# Patient Record
Sex: MALE | Race: WHITE | NOT HISPANIC OR LATINO | Employment: OTHER | URBAN - METROPOLITAN AREA
[De-identification: names, ages, dates, MRNs, and addresses within clinical notes are randomized per-mention and may not be internally consistent; named-entity substitution may affect disease eponyms.]

---

## 2019-02-14 ENCOUNTER — HOSPITAL ENCOUNTER (EMERGENCY)
Facility: HOSPITAL | Age: 79
Discharge: HOME | End: 2019-02-14
Attending: EMERGENCY MEDICINE
Payer: MEDICARE

## 2019-02-14 ENCOUNTER — APPOINTMENT (EMERGENCY)
Dept: RADIOLOGY | Facility: HOSPITAL | Age: 79
End: 2019-02-14
Payer: MEDICARE

## 2019-02-14 VITALS
TEMPERATURE: 97.5 F | WEIGHT: 208.34 LBS | HEART RATE: 60 BPM | OXYGEN SATURATION: 100 % | DIASTOLIC BLOOD PRESSURE: 74 MMHG | RESPIRATION RATE: 16 BRPM | SYSTOLIC BLOOD PRESSURE: 147 MMHG

## 2019-02-14 DIAGNOSIS — S43.014A ANTERIOR DISLOCATION OF RIGHT SHOULDER, INITIAL ENCOUNTER: Primary | ICD-10-CM

## 2019-02-14 PROCEDURE — 73030 X-RAY EXAM OF SHOULDER: CPT

## 2019-02-14 PROCEDURE — 73020 X-RAY EXAM OF SHOULDER: CPT

## 2019-02-14 PROCEDURE — 96374 THER/PROPH/DIAG INJ IV PUSH: CPT

## 2019-02-14 PROCEDURE — 99284 EMERGENCY DEPT VISIT MOD MDM: CPT

## 2019-02-14 PROCEDURE — 73060 X-RAY EXAM OF HUMERUS: CPT

## 2019-02-14 RX ORDER — FENTANYL CITRATE 50 UG/ML
50 INJECTION, SOLUTION INTRAMUSCULAR; INTRAVENOUS ONCE
Status: COMPLETED | OUTPATIENT
Start: 2019-02-14 | End: 2019-02-14

## 2019-02-14 RX ORDER — LISINOPRIL 5 MG/1
5 TABLET ORAL DAILY
COMMUNITY

## 2019-02-14 RX ORDER — ACETAMINOPHEN 325 MG/1
650 TABLET ORAL EVERY 6 HOURS PRN
Qty: 30 TABLET | Refills: 0 | Status: SHIPPED | OUTPATIENT
Start: 2019-02-14

## 2019-02-14 RX ORDER — ATORVASTATIN CALCIUM 40 MG/1
40 TABLET, FILM COATED ORAL DAILY
COMMUNITY

## 2019-02-14 RX ORDER — LATANOPROST 50 UG/ML
1 SOLUTION/ DROPS OPHTHALMIC
COMMUNITY

## 2019-02-14 RX ORDER — METOPROLOL SUCCINATE 25 MG/1
25 TABLET, EXTENDED RELEASE ORAL DAILY
COMMUNITY

## 2019-02-14 RX ORDER — ASPIRIN 81 MG/1
81 TABLET ORAL DAILY
COMMUNITY

## 2019-02-14 RX ORDER — LIDOCAINE HYDROCHLORIDE 20 MG/ML
10 INJECTION, SOLUTION EPIDURAL; INFILTRATION; INTRACAUDAL; PERINEURAL ONCE
Status: COMPLETED | OUTPATIENT
Start: 2019-02-14 | End: 2019-02-14

## 2019-02-14 RX ADMIN — FENTANYL CITRATE 50 MCG: 50 INJECTION, SOLUTION INTRAMUSCULAR; INTRAVENOUS at 11:48

## 2019-02-14 RX ADMIN — LIDOCAINE HYDROCHLORIDE 10 ML: 20 INJECTION, SOLUTION EPIDURAL; INFILTRATION; INTRACAUDAL; PERINEURAL at 11:54

## 2019-02-14 NOTE — ED NOTES
Patient denies hitting head but does have some pain in right side of neck       [address] RN  02/14/19 8170

## 2019-02-14 NOTE — ED PROVIDER NOTES
History  Chief Complaint   Patient presents with    Fall     Patient slipped on ice and fell  He landed with right arm outstretched  Pain in right should and humerus  No deformity noted  Patient denies hitting head     [de-identified] y/o M with PMhx of HTN, HLD, who presents to the ED for right shoulder pain s/p mechanical slip and fall on ice today  Patient reports that he fell onto the outstretched right upper extremity, resulting in decreased ROM and pain to the right shoulder and upper arm  Patient denies any numbness, tingling, weakness  Denies redness, warmth  Denies fevers, chills  Denies chest pain, SOB, palpitations  No pain Rx given prior to arrival        History provided by:  Patient   used: No        Prior to Admission Medications   Prescriptions Last Dose Informant Patient Reported? Taking? Cholecalciferol 1000 units TBDP   Yes Yes   Sig: Take 1,000 Units by mouth daily   aspirin (ECOTRIN LOW STRENGTH) 81 mg EC tablet   Yes Yes   Sig: Take 81 mg by mouth daily   atorvastatin (LIPITOR) 40 mg tablet   Yes Yes   Sig: Take 40 mg by mouth daily   latanoprost (XALATAN) 0 005 % ophthalmic solution   Yes Yes   Sig: Administer 1 drop to both eyes daily at bedtime   lisinopril (ZESTRIL) 5 mg tablet   Yes Yes   Sig: Take 5 mg by mouth daily   metoprolol succinate (TOPROL-XL) 25 mg 24 hr tablet   Yes Yes   Sig: Take 25 mg by mouth daily   timolol (BETIMOL) 0 25 % ophthalmic solution   Yes Yes   Sig: Administer 1 drop to both eyes daily      Facility-Administered Medications: None       History reviewed  No pertinent past medical history  History reviewed  No pertinent surgical history  History reviewed  No pertinent family history  I have reviewed and agree with the history as documented      Social History     Tobacco Use    Smoking status: Former Smoker    Smokeless tobacco: Never Used   Substance Use Topics    Alcohol use: Not on file    Drug use: Not on file        Review of Systems Constitutional: Negative for chills and fever  HENT: Negative for congestion, ear pain, postnasal drip, rhinorrhea, sinus pressure, sinus pain, sore throat and trouble swallowing  Eyes: Negative  Respiratory: Negative for cough, chest tightness, shortness of breath and wheezing  Cardiovascular: Negative for chest pain, palpitations and leg swelling  Gastrointestinal: Negative for abdominal pain, anal bleeding, constipation, diarrhea, nausea and vomiting  Genitourinary: Negative for dysuria, flank pain, frequency, hematuria and urgency  Musculoskeletal: Positive for arthralgias  Negative for back pain, gait problem, joint swelling, myalgias, neck pain and neck stiffness  Skin: Negative for color change, pallor, rash and wound  Neurological: Negative for dizziness, syncope, weakness, light-headedness, numbness and headaches  Psychiatric/Behavioral: Negative  Physical Exam  Physical Exam   Constitutional: He is oriented to person, place, and time  He appears well-developed and well-nourished  No distress  HENT:   Head: Normocephalic and atraumatic  Eyes: Pupils are equal, round, and reactive to light  Conjunctivae and EOM are normal    Neck: Normal range of motion  Neck supple  Cardiovascular: Normal rate, regular rhythm and intact distal pulses  Pulmonary/Chest: Effort normal and breath sounds normal  He has no wheezes  He has no rales  Abdominal: Soft  Bowel sounds are normal  He exhibits no distension  There is no tenderness  There is no rebound and no guarding  Musculoskeletal: He exhibits tenderness and deformity  He exhibits no edema  Decreased ROM of the right shoulder  There is an empty socket present to the right shoulder  +Pain with palpation to the right proximal shoulder  No swelling  Neurological: He is alert and oriented to person, place, and time  No cranial nerve deficit or sensory deficit  He exhibits normal muscle tone   Coordination normal    Median, ulnar, radial nerves are +5/5 for sensation and strength to bilateral upper extremities  Skin: Skin is warm and dry  Capillary refill takes less than 2 seconds  He is not diaphoretic  Psychiatric: He has a normal mood and affect  His behavior is normal    Nursing note and vitals reviewed  Vital Signs  ED Triage Vitals   Temperature Pulse Respirations Blood Pressure SpO2   02/14/19 0941 02/14/19 0941 02/14/19 0941 02/14/19 0941 02/14/19 0941   97 5 °F (36 4 °C) 66 16 145/84 95 %      Temp Source Heart Rate Source Patient Position - Orthostatic VS BP Location FiO2 (%)   02/14/19 0941 02/14/19 1225 02/14/19 1225 02/14/19 1225 --   Oral Monitor Lying Right arm       Pain Score       02/14/19 0941       5           Vitals:    02/14/19 0941 02/14/19 1225 02/14/19 1404   BP: 145/84 141/86 147/74   Pulse: 66 80 60   Patient Position - Orthostatic VS:  Lying Lying       Visual Acuity      ED Medications  Medications   lidocaine (PF) (XYLOCAINE-MPF) 2 % injection 10 mL (10 mL Infiltration Given by Other 2/14/19 1154)   fentanyl citrate (PF) 100 MCG/2ML 50 mcg (50 mcg Intravenous Given 2/14/19 1148)       Diagnostic Studies  Results Reviewed     None                 XR shoulder 2+ vw right   ED Interpretation by Libbie Gaucher, PA-C (02/14 1510)   Successful reduction of shoulder  Final Result by Clay Field MD (02/14 1609)      1  Successful reduction of right shoulder dislocation  2   Hill-Sachs fracture of the right humeral head  Workstation performed: [id number]         XR shoulder 1 vw right   Final Result by Anthony Bell MD (02/14 1307)      Persistent dislocation of the right shoulder            Workstation performed: [id number]         XR humerus RIGHT   ED Interpretation by Libbie Gaucher, PA-C (02/14 1134)   No fracture  Final Result by Archie Bradford MD (02/14 1220)      Anterior right shoulder dislocation              Workstation performed: [id number]         XR shoulder 2+ views RIGHT   ED Interpretation by Thai Quiroga PA-C (02/14 3911)   Anterior Humeral head dislocation      Final Result by Dorinda Ruiz MD (02/14 1221)      Anterior right shoulder dislocation  No discrete fracture  As per comments in the PACS workstation, findings are concordant with preliminary interpretation provided by the emergency room physician  Workstation performed: [id number]                    Procedures  Orthopedic Injury  Date/Time: 2/14/2019 12:55 PM  Performed by: Thai Quiroga PA-C  Authorized by: Feli Milton DO     Patient Location:  ED  Other Assisting Provider: Yes (comment) (Dr Evette Hester)    Verbal consent obtained?: Yes    Written consent obtained?: Yes    Emergent situation    Risks and benefits: Risks, benefits and alternatives were discussed    Consent given by:  Patient  Patient identity confirmed:  Verbally with patient  Injury location:  Shoulder  Location details:  Right shoulder  Injury type:  Dislocation  Dislocation type: anterior    Chronicity:  New  Neurovascular status: Neurovascularly intact    Distal perfusion: normal    Neurological function: normal    Range of motion: reduced    Local anesthesia used?: Yes    Anesthesia method: intraarticular    Local anesthetic:  Lidocaine 2% without epinephrine  Anesthetic total (ml):  10  Manipulation performed?: Yes    Reduction method:  Scapular manipulation, traction and counter traction, external rotation, Spaso technique, Milch technique and Young maneuver  Reduction method:  Scapular manipulation, traction and counter traction, external rotation, Spaso technique, Milch technique and Young maneuver  Reduction method:  Scapular manipulation, traction and counter traction, external rotation, Spaso technique, Milch technique and Young maneuver  Reduction method:  Scapular manipulation, traction and counter traction, external rotation, Spaso technique, Milch technique and Young maneuver  Reduction method: Scapular manipulation, traction and counter traction, external rotation, Spaso technique, Milch technique and Young maneuver  Reduction method:  Scapular manipulation, traction and counter traction, external rotation, Spaso technique, Milch technique and Young maneuver  Reduction successful?: No    Confirmation: Reduction confirmed by x-ray    Immobilization:  Sling  Neurovascular status: Neurovascularly intact    Distal perfusion: normal    Neurological function: normal    Range of motion: improved    Patient tolerance:  Patient tolerated the procedure well with no immediate complications   ER attempt at reduction unsuccessful  Reduction was successful following orthopedist attempt  Confirmed with XR  Phone Contacts  ED Phone Contact    ED Course  ED Course as of Feb 15 2238   Thu Feb 14, 2019   1334 XR shoulder s/p reduction shows continued dislocation  Reduction attempted with Dr Taylor Lombardi  Feeling humeral head slip in and out of the joint space  Concerned for unstable joint  Will contact ortho for recommendation  Vene 89 with Dr Ok Escobar (ortho), who will notify Dr Lavon Szymanski or Howard (who are at [facility] today for surgery) regarding patient  1502 Seen in the ED by Dr Lavon Szymanski and PA  They were able to get shoulder reduced with patient with improved ROM  Ordered repeat shoulder XR       1510 XR reviewed and shows successful reduction of shoulder  MDM  Number of Diagnoses or Management Options  Anterior dislocation of right shoulder, initial encounter:   Diagnosis management comments: Differential Diagnosis includes but is not limited to: sprain/strain, contusion, fracture/dislocation, musculoskeletal pain  XR right shoulder shows dislocation, but no acute fracture  Reduction attempted  Patient had good range of motion of the right shoulder and improved pain  XR of the right shoulder post reduction shows continued dislocation   Dr Taylor Lombardi also attempted reduction  Felt humeral head enter and exit joint space multiple times  Ortho consulted as patient might have an unstable joint as he had previous injury years ago and never regained full ROM of the shoulder  Dr Can Mar and BAKARI Chaney saw patient in the ED and performed successful reduction that was confirmed with XR  Patient placed in sling  Instructed to follow up with ortho within the next week  Amount and/or Complexity of Data Reviewed  Tests in the radiology section of CPT®: reviewed and ordered  Discuss the patient with other providers: yes (Orthopedics)  Independent visualization of images, tracings, or specimens: yes        Disposition  Final diagnoses:   Anterior dislocation of right shoulder, initial encounter     Time reflects when diagnosis was documented in both MDM as applicable and the Disposition within this note     Time User Action Codes Description Comment    2/14/2019  3:11 PM Trevor Vásquez Add [S43 [id number]] Anterior dislocation of right shoulder, initial encounter       ED Disposition     ED Disposition Condition Date/Time Comment    Discharge Stable Thu Feb 14, 2019  3:11 PM [address] [place] [address] discharge to home/self care  Follow-up Information     Follow up With Specialties Details Why Contact Info Additional Fallon Willard 44 Orthopedic Surgery In 1 week right shoulder dislocation   [address] [address]  [address] [place] [address], [address] [place] [address],  [address] [place] [address], [id number]          Discharge Medication List as of 2/14/2019  3:15 PM      START taking these medications    Details   acetaminophen (TYLENOL) 325 mg tablet Take 2 tablets (650 mg total) by mouth every 6 (six) hours as needed for mild pain or moderate pain, Starting Thu 2/14/2019, Print         CONTINUE these medications which have NOT CHANGED    Details   aspirin (Roberto Barclay LOW STRENGTH) 81 mg EC tablet Take 81 mg by mouth daily, Historical Med      atorvastatin (LIPITOR) 40 mg tablet Take 40 mg by mouth daily, Historical Med      Cholecalciferol 1000 units TBDP Take 1,000 Units by mouth daily, Historical Med      latanoprost (XALATAN) 0 005 % ophthalmic solution Administer 1 drop to both eyes daily at bedtime, Historical Med      lisinopril (ZESTRIL) 5 mg tablet Take 5 mg by mouth daily, Historical Med      metoprolol succinate (TOPROL-XL) 25 mg 24 hr tablet Take 25 mg by mouth daily, Historical Med      timolol (BETIMOL) 0 25 % ophthalmic solution Administer 1 drop to both eyes daily, Historical Med           No discharge procedures on file      ED Provider  Electronically Signed by           John Chi PA-C  02/15/19 9808

## 2019-02-14 NOTE — ED ATTENDING ATTESTATION
Marian Whitehead DO, saw and evaluated the patient  I have discussed the patient with the resident/non-physician practitioner and agree with the resident's/non-physician practitioner's findings, Plan of Care, and MDM as documented in the resident's/non-physician practitioner's note, except where noted  All available labs and Radiology studies were reviewed  At this point I agree with the current assessment done in the Emergency Department  I have conducted an independent evaluation of this patient a history and physical is as follows:    23-year-old male presents for evaluation of right shoulder discomfort after fall  On examination, the patient is neurovascularly intact  The patient has full range of motion of the right upper extremity  On palpation there is a deformity consistent with an anterior shoulder dislocation despite full range of motion without anesthesia  The x-rays reviewed after the PA attempted reduction without success  I attempted numerous techniques for reduction  The patient has good mobility of the arm and shoulder  I can feel the humeral head moving within the shoulder joint however it does not stay in place    Discussed plan to discuss with Orthopedics      Critical Care Time  Procedures

## 2019-02-14 NOTE — ED NOTES
Provider at bedside [facility]      Josué Hernandez RN  02/14/19 4128

## 2019-02-19 ENCOUNTER — APPOINTMENT (OUTPATIENT)
Dept: RADIOLOGY | Facility: CLINIC | Age: 79
End: 2019-02-19
Payer: MEDICARE

## 2019-02-19 VITALS
HEART RATE: 63 BPM | BODY MASS INDEX: 31.08 KG/M2 | SYSTOLIC BLOOD PRESSURE: 119 MMHG | HEIGHT: 67 IN | WEIGHT: 198 LBS | DIASTOLIC BLOOD PRESSURE: 78 MMHG

## 2019-02-19 DIAGNOSIS — M25.511 RIGHT SHOULDER PAIN, UNSPECIFIED CHRONICITY: ICD-10-CM

## 2019-02-19 DIAGNOSIS — M25.511 RIGHT SHOULDER PAIN, UNSPECIFIED CHRONICITY: Primary | ICD-10-CM

## 2019-02-19 PROCEDURE — 99204 OFFICE O/P NEW MOD 45 MIN: CPT | Performed by: ORTHOPAEDIC SURGERY

## 2019-02-19 PROCEDURE — 73030 X-RAY EXAM OF SHOULDER: CPT

## 2019-02-19 NOTE — PROGRESS NOTES
Orthopaedic Surgery - Office Note  Nabila Rios (13 y o  male)   : 1940   MRN: [id number]  Encounter Date: 2019    Chief Complaint   Patient presents with    Right Shoulder - Pain       Assessment / Plan  Status post right shoulder anterior dislocation on 2019    · We did have a long discussion about treatment options following shoulder dislocation  At this time the patient agreed to proceed with physical therapy to try to regain range of motion strengthening of the right shoulder  We did discuss that it was common to have a rotator cuff tear along with the dislocation  If he is not able to progress with physical therapy will consider MRI study to further evaluate his shoulder  · Continue with ice and analgesics as needed  · Patient was given a prescription for physical therapy to start range of motion strengthening exercises  · Patient can wean from the sling as tolerated  Return in about 1 month (around 3/19/2019)  History of Present Illness  Nabila Rios is a [de-identified] y o  male who presents as a follow-up after sustaining a right shoulder anterior dislocation on 2019 when he slipped on ice  The patient was seen in the emergency room as a consult and we did reduce his dislocation at that time  He was discharged home in a sling and has been compliant with minimal use of the right arm since  He feels that since his pain has been improving greatly and at this time seems pretty minimal   He does have stiffness in his shoulder as expected  He has full range of motion in his elbow  He denies any numbness or tingling distally  He denies any previous injury to that arm  Review of Systems  Pertinent items are noted in HPI  All other systems were reviewed and are negative  Physical Exam  /78   Pulse 63   Ht 5' 7" (1 702 m)   Wt 89 8 kg (198 lb)   BMI 31 01 kg/m²   Cons: Appears well  No apparent distress  Psych: Alert  Oriented x3    Mood and affect normal   Eyes: PERRLA, EOMI  Resp: Normal effort  No audible wheezing or stridor  CV: Palpable pulse  No discernable arrhythmia  No LE edema  Lymph:  No palpable cervical, axillary, or inguinal lymphadenopathy  Skin: Warm  No palpable masses  No visible lesions  Neuro: Normal muscle tone  Normal and symmetric DTR's  Right Shoulder Exam  Alignment / Posture:  Normal shoulder posture  Normal scapular position  Inspection:  No swelling  No edema  No ecchymosis  No deformity  Palpation:  Mild tenderness at General area of the shoulder with no specific point tenderness at this time  ROM:  Shoulder °  Shoulder ER 60°  Normal elbow ROM  Strength:  Supraspinatus 4/5  Infraspinatus 3/5  Subscapularis 5/5  Stability:  (+) Apprehension  Tests: Due to patient's limited range of motion is difficult to do any special test at this time  (-) Belly press  (-) Internal impingement test   Neurovascular:  Sensation intact in Ax/R/M/U nerve distributions  Sensation intact in all digital nerve distributions  Fingers warm and perfused  Studies Reviewed  I have personally reviewed pertinent films in PACS  XR of right shoulder - From 02/19/2019 show no fracture dislocation  There are mild arthritic changes in the [facility] joint noted at this time  Procedures  No procedures today  Medical, Surgical, Family, and Social History  The patient's medical history, family history, and social history, were reviewed and updated as appropriate  History reviewed  No pertinent past medical history  History reviewed  No pertinent surgical history  History reviewed  No pertinent family history      Social History     Occupational History    Not on file   Tobacco Use    Smoking status: Former Smoker    Smokeless tobacco: Never Used   Substance and Sexual Activity    Alcohol use: Not on file    Drug use: Not on file    Sexual activity: Not on file       No Known Allergies      Current Outpatient Medications:    acetaminophen (TYLENOL) 325 mg tablet, Take 2 tablets (650 mg total) by mouth every 6 (six) hours as needed for mild pain or moderate pain, Disp: 30 tablet, Rfl: 0    aspirin (ECOTRIN LOW STRENGTH) 81 mg EC tablet, Take 81 mg by mouth daily, Disp: , Rfl:     atorvastatin (LIPITOR) 40 mg tablet, Take 40 mg by mouth daily, Disp: , Rfl:     Cholecalciferol 1000 units TBDP, Take 1,000 Units by mouth daily, Disp: , Rfl:     latanoprost (XALATAN) 0 005 % ophthalmic solution, Administer 1 drop to both eyes daily at bedtime, Disp: , Rfl:     lisinopril (ZESTRIL) 5 mg tablet, Take 5 mg by mouth daily, Disp: , Rfl:     metoprolol succinate (TOPROL-XL) 25 mg 24 hr tablet, Take 25 mg by mouth daily, Disp: , Rfl:     timolol (BETIMOL) 0 25 % ophthalmic solution, Administer 1 drop to both eyes daily, Disp: , Rfl:       Alisha Daniels PA-C    Scribe Attestation    I,:    am acting as a scribe while in the presence of the attending physician :        I,:    personally performed the services described in this documentation    as scribed in my presence :

## 2019-03-04 ENCOUNTER — EVALUATION (OUTPATIENT)
Dept: PHYSICAL THERAPY | Facility: CLINIC | Age: 79
End: 2019-03-04
Payer: MEDICARE

## 2019-03-04 DIAGNOSIS — S43.004S SHOULDER DISLOCATION, RIGHT, SEQUELA: ICD-10-CM

## 2019-03-04 DIAGNOSIS — M21.821 HILL SACHS DEFORMITY, RIGHT: ICD-10-CM

## 2019-03-04 DIAGNOSIS — M25.511 RIGHT SHOULDER PAIN, UNSPECIFIED CHRONICITY: Primary | ICD-10-CM

## 2019-03-04 PROCEDURE — G8990 OTHER PT/OT CURRENT STATUS: HCPCS | Performed by: PHYSICAL THERAPIST

## 2019-03-04 PROCEDURE — G8991 OTHER PT/OT GOAL STATUS: HCPCS | Performed by: PHYSICAL THERAPIST

## 2019-03-04 PROCEDURE — 97162 PT EVAL MOD COMPLEX 30 MIN: CPT | Performed by: PHYSICAL THERAPIST

## 2019-03-04 NOTE — PROGRESS NOTES
PT Evaluation     Today's date: 3/4/2019  Patient name: Jose Vidales  : 1940  MRN: [id number]  Referring provider: Kayode Lemus PA-C  Dx:   Encounter Diagnosis     ICD-10-CM    1  Right shoulder pain, unspecified chronicity M25 511 Ambulatory referral to Physical Therapy                  Assessment  Assessment details: Pt is a [de-identified]year old right-handed male presenting to PT approximately 3 weeks s/p right anterior shoulder dislocation with hill sachs deformity  Pt presents with deficits in posture, right shoulder range of motion and strength  Pt also with limitation in his ability to perform ADLs  Pt with signs and symptoms consistent with possible RTC pathology  Pt is a good candidate for skilled PT intervention and will benefit from treatment in order to address his limitations and to restore maximal function  Impairments: abnormal coordination, abnormal or restricted ROM, activity intolerance, impaired physical strength and pain with function  Understanding of Dx/Px/POC: good   Prognosis: good    Goals  Short Term Goals: To be achieved by 4 weeks    1) Patient to be independent with basic HEP  2) Decrease pain to 5/10 at worst   3) Increase ROM by 5 degrees or greater in all deficient planes  4) Increase strength by 1/2 MMT grade or greater in all deficient planes  Long Term Goals: To be achieved by discharge    1) FOTO equal to or greater than 74   2) Patient to be independent with comprehensive HEP  3) Decrease pain to 3/10 at worst  for improved quality of life  4) Increase strength to 5/5 MMT grade in all deficient planes to improve a/iadls  5) Increase ROM to within normal limits          Plan  Patient would benefit from: PT eval and skilled PT  Planned modality interventions: cryotherapy and thermotherapy: hydrocollator packs  Planned therapy interventions: IADL retraining, body mechanics training, flexibility, functional ROM exercises, home exercise program, neuromuscular re-education, manual therapy, postural training, strengthening, stretching, therapeutic activities, therapeutic exercise and joint mobilization  Frequency: 2x week  Duration in visits: 8  Duration in weeks: 4  Treatment plan discussed with: patient        Subjective Evaluation    History of Present Illness  Mechanism of injury: Pt is a [de-identified]year old right-handed male presenting to PT approximately 3 weeks s/p right anterior shoulder dislocation  Pt reports he walked out of his home and stepped onto his driveway and slipped on black ice, falling forward with his arm out in front  Pt reports he had immediate right shoulder pain, a neighbor witnessed the fall and called EMS  Pt was taken to ER where x-rays confirmed anterior shoulder dislocation  Pt was given medication and his shoulder was reduced, follow-up x-ray showed: Successful reduction of right shoulder dislocation, Hill-Sachs fracture of the right humeral head  Pt placed in sling for 2 weeks  Pt was evaluated by Dr Enio Senior, and referred to OPPT  Pain Location: right shoulder, radiating down to elbow    Pain Type: pulling, aching    Pain Intensity:  Current: 0  Best: 0  Worst: 6      Pt reports increased pain and/or difficulty with: reaching overhead or behind his back, lifting, right side-lying; pt denies sleep disturbance secondary to shoulder pain  Pt reports decreased pain with: rest, Tylenol      Diagnostic Tests  X-ray: abnormal  Treatments  Previous treatment: immobilization  Patient Goals  Patient goals for therapy: decreased pain, increased motion, increased strength and independence with ADLs/IADLs          Objective     Postural Observations    Additional Postural Observation Details  Forward head, bilateral rounded shoulders, mildly increased thoracic kyphosis      Cervical/Thoracic Screen   Cervical range of motion within normal limits    Neurological Testing     Sensation     Shoulder   Left Shoulder   Intact: light touch    Right Shoulder   Intact: light touch    Active Range of Motion   Left Shoulder   Flexion: 155 degrees   Abduction: 145 degrees   External rotation BTH: T3   Internal rotation BTB: T12     Right Shoulder   Flexion: 122 degrees with pain  Abduction: 95 degrees with pain  External rotation BTH: C2 with pain  Internal rotation BTB: L4 with pain    Passive Range of Motion   Left Shoulder   Flexion: 165 degrees   Abduction: 165 degrees   External rotation 90°: 90 degrees   Internal rotation 90°: 25 degrees     Right Shoulder   Flexion: 130 degrees with pain  Abduction: 130 degrees with pain  External rotation 90°: 60 degrees with pain  Internal rotation 90°: 10 degrees with pain    Joint Play     Additional Joint Play Details  Not tested secondary to recent anterior dislocation    Strength/Myotome Testing     Left Shoulder     Planes of Motion   Flexion: 5   Abduction: 5   External rotation at 0°: 5   Internal rotation at 0°: 5     Isolated Muscles   Biceps: 5   Triceps: 5     Right Shoulder     Planes of Motion   Flexion: 4-   Abduction: 4-   External rotation at 0°: 2-   Internal rotation at 0°: 3-     Isolated Muscles   Biceps: 4-   Triceps: 4-     Tests     Right Shoulder   Positive external rotation lag sign             Precautions: HTN, HLD    Daily Treatment Diary       Assessment 3/4            Eval/Timothy DICKINSON            FOTO 72            POC Signed             HEP Issued              Manuals 3/4            R shoulder PROM - caution with ER             Exercise Diary  3/4            Table Slides: Flexion & Scaption 5"x10 ea            Scap Retractions 5"x10            Flexion Wall Slides             Supine Wand Press Ups             Supine Wand Flexion             S/L ER             Sub Max Isometrics: Flex, Abd, Add                                                                                                                                  Modalities             CP R shoulder  Seated  Post-Tx HEP: NV

## 2019-03-11 ENCOUNTER — OFFICE VISIT (OUTPATIENT)
Dept: PHYSICAL THERAPY | Facility: CLINIC | Age: 79
End: 2019-03-11
Payer: MEDICARE

## 2019-03-11 DIAGNOSIS — S43.004S SHOULDER DISLOCATION, RIGHT, SEQUELA: ICD-10-CM

## 2019-03-11 DIAGNOSIS — M25.511 RIGHT SHOULDER PAIN, UNSPECIFIED CHRONICITY: Primary | ICD-10-CM

## 2019-03-11 DIAGNOSIS — M21.821 HILL SACHS DEFORMITY, RIGHT: ICD-10-CM

## 2019-03-11 PROCEDURE — 97110 THERAPEUTIC EXERCISES: CPT | Performed by: PHYSICAL THERAPIST

## 2019-03-11 PROCEDURE — 97140 MANUAL THERAPY 1/> REGIONS: CPT | Performed by: PHYSICAL THERAPIST

## 2019-03-11 NOTE — PROGRESS NOTES
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    3  Shoulder dislocation, right, sequela S43 004S                 Subjective: Pt reports no questions/concerns regarding HEP and reports daily compliance  He reports his shoulder has been feeling better  Objective: See treatment diary below  Precautions: HTN, HLD     Daily Treatment Diary     Assessment 3/4 3/11           Eval/Reval MD            FOTO 72            POC Signed             HEP Issued              Manuals 3/4 3/11           R shoulder PROM - caution with ER  MD           Exercise Diary  3/4 3/11           Table Slides: Flexion & Scaption 5"x10 ea 5"x15 ea           Scap Retractions 5"x10 5"x10           Flexion Wall Slides  5"x10           Supine Wand Press Ups  2#  5"x10           Supine Wand Flexion  5"x10           S/L ER  5"x10           Sub Max Isometrics: Flex, Abd, Add  5"x10 ea                                     Modalities  3/11           CP R shoulder  Seated  Post-Tx  10'                          HEP: Table slides, scap retractions    Assessment: Pt with good tolerance to progression of program reporting mild fatigue and no increase in shoulder pain  Pt required minimal to moderate verbal and manual cues to ensure correct performance of side-lying ER and submaximal isometrics  Pt will benefit from continued skilled PT intervention in order to address his remaining limitations and to restore maximal function  Plan: Continue per plan of care  Progress treatment as tolerated

## 2019-03-12 ENCOUNTER — OFFICE VISIT (OUTPATIENT)
Dept: OBGYN CLINIC | Facility: MEDICAL CENTER | Age: 79
End: 2019-03-12
Payer: MEDICARE

## 2019-03-12 VITALS
WEIGHT: 207 LBS | HEIGHT: 67 IN | BODY MASS INDEX: 32.49 KG/M2 | HEART RATE: 73 BPM | DIASTOLIC BLOOD PRESSURE: 71 MMHG | SYSTOLIC BLOOD PRESSURE: 114 MMHG

## 2019-03-12 DIAGNOSIS — M25.511 ACUTE PAIN OF RIGHT SHOULDER: Primary | ICD-10-CM

## 2019-03-12 PROCEDURE — 99213 OFFICE O/P EST LOW 20 MIN: CPT | Performed by: ORTHOPAEDIC SURGERY

## 2019-03-12 NOTE — PROGRESS NOTES
Orthopaedic Surgery - Office Note  Shelby Todd (6 y o  male)   : 1940   MRN: [id number]  Encounter Date: 3/12/2019    Chief Complaint   Patient presents with    Right Shoulder - Follow-up       Assessment / Plan  Status post right shoulder anterior dislocation on 2019    · Continue outpatient PT  · Focus on progressing strength  · Focus on progressing ROM   · It was discussed with the patient today the risk of conservative treatment understanding that he most likely does have a rotator cuff tear  He was educated that the regain of strength might not be able to be reached  · No follow-ups on file  History of Present Illness  Shelby Todd is a [de-identified] y o  male who presents Status post right shoulder anterior dislocation on 2019  Patient complains of a pulling sensation into his right shoulder however he is not having any pain  Patient states that he is overall doing well  He notices improvements weekly  He just begun formal physical therapy last night  He states that he is not having any difficulties with his ADLs and denies any recurrent instability  Patient wishes to continue with conservative management at this time  Review of Systems  Pertinent items are noted in HPI  All other systems were reviewed and are negative  Physical Exam  There were no vitals taken for this visit  Cons: Appears well  No apparent distress  Psych: Alert  Oriented x3  Mood and affect normal   Eyes: PERRLA, EOMI  Resp: Normal effort  No audible wheezing or stridor  CV: Palpable pulse  No discernable arrhythmia  No LE edema  Lymph:  No palpable cervical, axillary, or inguinal lymphadenopathy  Skin: Warm  No palpable masses  No visible lesions  Neuro: Normal muscle tone  Normal and symmetric DTR's  Right Shoulder Exam  Alignment / Posture:  Normal shoulder posture  Inspection:  No swelling  Palpation:  No tenderness  ROM:  Shoulder   Shoulder ER 40     Strength: Supraspinatus 4/5  Infraspinatus 4-/5  Stability:  Not tested  Tests: No pertinent positive or negative tests  Neurovascular:  Sensation intact in Ax/R/M/U nerve distributions  2+ radial pulse  Studies Reviewed  No studies to review    Procedures  No procedures today  Medical, Surgical, Family, and Social History  The patient's medical history, family history, and social history, were reviewed and updated as appropriate  Past Medical History:   Diagnosis Date    Anterior shoulder dislocation 02/14/2019    right shoulder    Hypercholesteremia     Hypertension     Myocardial infarction ([facility] [address] ) 2010       No past surgical history on file  No family history on file      Social History     Occupational History    Not on file   Tobacco Use    Smoking status: Former Smoker    Smokeless tobacco: Never Used   Substance and Sexual Activity    Alcohol use: Not on file    Drug use: Not on file    Sexual activity: Not on file       Allergies   Allergen Reactions    Alphagan [Brimonidine] Rash         Current Outpatient Medications:     acetaminophen (TYLENOL) 325 mg tablet, Take 2 tablets (650 mg total) by mouth every 6 (six) hours as needed for mild pain or moderate pain, Disp: 30 tablet, Rfl: 0    aspirin (ECOTRIN LOW STRENGTH) 81 mg EC tablet, Take 81 mg by mouth daily, Disp: , Rfl:     atorvastatin (LIPITOR) 40 mg tablet, Take 40 mg by mouth daily, Disp: , Rfl:     Cholecalciferol 1000 units TBDP, Take 1,000 Units by mouth daily, Disp: , Rfl:     latanoprost (XALATAN) 0 005 % ophthalmic solution, Administer 1 drop to both eyes daily at bedtime, Disp: , Rfl:     lisinopril (ZESTRIL) 5 mg tablet, Take 5 mg by mouth daily, Disp: , Rfl:     metoprolol succinate (TOPROL-XL) 25 mg 24 hr tablet, Take 25 mg by mouth daily, Disp: , Rfl:     timolol (BETIMOL) 0 25 % ophthalmic solution, Administer 1 drop to both eyes daily, Disp: , Rfl:       Shelly Skaggs Attestation    I,:   Chasidy Shields am acting as a scribe while in the presence of the attending physician :        I,:   Sharlotte Castleman, MD personally performed the services described in this documentation    as scribed in my presence :

## 2019-03-13 ENCOUNTER — OFFICE VISIT (OUTPATIENT)
Dept: PHYSICAL THERAPY | Facility: CLINIC | Age: 79
End: 2019-03-13
Payer: MEDICARE

## 2019-03-13 DIAGNOSIS — M25.511 RIGHT SHOULDER PAIN, UNSPECIFIED CHRONICITY: Primary | ICD-10-CM

## 2019-03-13 DIAGNOSIS — S43.004S SHOULDER DISLOCATION, RIGHT, SEQUELA: ICD-10-CM

## 2019-03-13 DIAGNOSIS — M21.821 HILL SACHS DEFORMITY, RIGHT: ICD-10-CM

## 2019-03-13 PROCEDURE — 97140 MANUAL THERAPY 1/> REGIONS: CPT

## 2019-03-13 PROCEDURE — 97110 THERAPEUTIC EXERCISES: CPT

## 2019-03-13 NOTE — PROGRESS NOTES
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    3  Shoulder dislocation, right, sequela S43 004S        Start Time: 1625  Stop Time: 1705  Total time in clinic (min): 40 minutes     Subjective: Pt reports he is feeling some increased soreness today due to having an MD appointment yesterday in which the MD had him in different positions  Objective: See treatment diary below  Precautions: HTN, HLD     Daily Treatment Diary     Assessment 3/4 3/11 3/13          Eval/Revlizette DICKINSON            FOTO 72            POC Signed             HEP Issued              Manuals 3/4 3/11 3/13          R shoulder PROM - caution with ER  MD HY          Exercise Diary  3/4 3/11 3/13          Table Slides: Flexion & Scaption 5"x10 ea 5"x15 ea 15x5" ea          Scap Retractions 5"x10 5"x10 20x5"          Flexion Wall Slides  5"x10 10x5"          Supine Wand Press Ups  2#  5"x10 2#  5"x10          Supine Wand Flexion  5"x10 5"x10          S/L ER  5"x10 10x5"          Sub Max Iso: Flex, Abd, Add  5"x10 ea 5"x10 ea          Pulleys   5'                       Modalities  3/11 3/13          CP R shoulder  Seated  Post-Tx  10' deferred                         HEP: Table slides, scap retractions    Assessment: Pt with good tolerance to progression of program reporting mild fatigue and no increase in shoulder pain  Pt continues to require min to mod VC/TCing to ensure correct performance of side-lying ER and submaximal isometrics  Pt shows moderate guarding during PROM requiring VCing to reduce guarding  Pt will benefit from continued skilled PT intervention in order to address his remaining limitations and to restore maximal function  Plan: Continue per plan of care    Progress treatment as tolerated

## 2019-03-18 ENCOUNTER — OFFICE VISIT (OUTPATIENT)
Dept: PHYSICAL THERAPY | Facility: CLINIC | Age: 79
End: 2019-03-18
Payer: MEDICARE

## 2019-03-18 DIAGNOSIS — M21.821 HILL SACHS DEFORMITY, RIGHT: ICD-10-CM

## 2019-03-18 DIAGNOSIS — M25.511 RIGHT SHOULDER PAIN, UNSPECIFIED CHRONICITY: Primary | ICD-10-CM

## 2019-03-18 DIAGNOSIS — S43.004S SHOULDER DISLOCATION, RIGHT, SEQUELA: ICD-10-CM

## 2019-03-18 PROCEDURE — 97140 MANUAL THERAPY 1/> REGIONS: CPT

## 2019-03-18 PROCEDURE — 97110 THERAPEUTIC EXERCISES: CPT

## 2019-03-18 NOTE — PROGRESS NOTES
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    3  Shoulder dislocation, right, sequela S43 004S                   Subjective: Pt reports pain overall much better recently  Asked to use heat to help with pain and stiffness in right shoulder  No change in pain reported throughout session, though increase in flexion and abduction PROM noted prior to onset of pain  Pt reports that he is able to reach cabinets overhead without difficulty, but is unable to do so with any weight in his R hand  Objective: See treatment diary below    Precautions: HTN, HLD     Daily Treatment Diary   Assessment 3/4 3/11 3/13 3/18         Eval/Reval MD            FOTO 72            POC Signed             HEP Issued              Manuals 3/4 3/11 3/13 3/18         R shoulder PROM - caution with ER  MD [facility]         Exercise Diary  3/4 3/11 3/13 3/18         Table Slides: Flexion & Scaption 5"x10 ea 5"x15 ea 15x5" ea 5"x5 ea HEP        Scap Retractions 5"x10 5"x10 20x5" HEP         Flexion Wall Slides  5"x10 10x5" 5"x10         Supine Wand Press Ups  2#  5"x10 2#  5"x10 2# 5"x10         Supine Wand Flexion  5"x10 5"x10 10"x10         S/L ER  5"x10 10x5" 5"x10         Sub Max Iso: Flex, Abd, Add  5"x10 ea 5"x10 ea 5"x5 ea         Pulleys w/u   5' 10"x5'         AROM flexion and scaption- standing    10x ea to eye level         IR stretch strap    20"x5                                   Table ER stretch    10"x5         Modalities  3/11 3/13 3/18         CP R shoulder  Seated  Post-Tx  10' deferred -                        HEP: Table slides, scap retractions    Assessment: Tolerated treatment well, including addition of standing AROM and IR stretch   Patient demonstrated fatigue post treatment, exhibited good technique with therapeutic exercises and would benefit from continued PT to further increase ROM and strength while reducing pain to tolerate ADLs  Plan: Continue per plan of care  Progress treatment as tolerated        Grey Ayalam, PTA

## 2019-03-20 ENCOUNTER — OFFICE VISIT (OUTPATIENT)
Dept: PHYSICAL THERAPY | Facility: CLINIC | Age: 79
End: 2019-03-20
Payer: MEDICARE

## 2019-03-20 DIAGNOSIS — M21.821 HILL SACHS DEFORMITY, RIGHT: ICD-10-CM

## 2019-03-20 DIAGNOSIS — M25.511 RIGHT SHOULDER PAIN, UNSPECIFIED CHRONICITY: Primary | ICD-10-CM

## 2019-03-20 DIAGNOSIS — S43.004S SHOULDER DISLOCATION, RIGHT, SEQUELA: ICD-10-CM

## 2019-03-20 PROCEDURE — 97110 THERAPEUTIC EXERCISES: CPT | Performed by: PHYSICAL THERAPIST

## 2019-03-20 PROCEDURE — 97140 MANUAL THERAPY 1/> REGIONS: CPT | Performed by: PHYSICAL THERAPIST

## 2019-03-20 NOTE — PROGRESS NOTES
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    3  Shoulder dislocation, right, sequela S43 004S                   Subjective: Pt reports "My shoulder is feeling a lot better " He reports compliance with HEP  He denies pain at rest, reports increased pain and difficulty with reaching above shoulder level or behind his back  Objective: See treatment diary below    Precautions: HTN, HLD, anterior shoulder dislocation (19)    Daily Treatment Diary   Assessment 3/4 3/11 3/13 3/18 3/20        Eval/Reval MD            FOTO 72            POC Signed             HEP Issued  Yes            Manuals 3/4 3/11 3/13 3/18 3/20        R shoulder PROM - caution with ER  MD HY 31 Soha Borges MD        Exercise Diary  3/4 3/11 3/13 3/18         Table Slides: Flexion & Scaption 5"x10 ea 5"x15 ea 15x5" ea 5"x5 ea HEP        Scap Retractions 5"x10 5"x10 20x5" HEP HEP        Flexion Wall Slides  5"x10 10x5" 5"x10 5"x15  @ mirror        Supine Wand Press Ups  2#  5"x10 2#  5"x10 2# 5"x10 2#  5"x15        Supine Wand Flexion  5"x10 5"x10 10"x10 5"x15        S/L ER  5"x10 10x5" 5"x10 5"  2x10 1#       Sub Max Iso: Flex, Abd, Add  5"x10 ea 5"x10 ea 5"x5 ea 5"x10 ea        Pulleys w/u   5' 10"x5' 10" hold x 5 min        AROM flexion and scaption- standing    10x ea to eye level 15 ea - shoulder level        IR stretch strap    20"x5 hold                                  Table ER stretch    10"x5 hold        Modalities  3/11 3/13 3/18 3/20        CP R shoulder  Seated  Post-Tx  10' deferred - -                       HEP: Table slides, scap retractions    Assessment: Pt with good tolerance to progression of program reporting no increase in pain with completion   Pt with good progress with his right shoulder PROM, as well as with improved AROM  Pt noted moderate fatigue with completion of wall slides and AROM into flexion and scaption  Pt will benefit from continued skilled PT intervention in order to address his remaining limitations and to restore maximal function  Plan: Continue per plan of care  Progress treatment as tolerated        Amber Raza, PT

## 2019-03-25 ENCOUNTER — OFFICE VISIT (OUTPATIENT)
Dept: PHYSICAL THERAPY | Facility: CLINIC | Age: 79
End: 2019-03-25
Payer: MEDICARE

## 2019-03-25 DIAGNOSIS — M21.821 HILL SACHS DEFORMITY, RIGHT: ICD-10-CM

## 2019-03-25 DIAGNOSIS — S43.004S SHOULDER DISLOCATION, RIGHT, SEQUELA: ICD-10-CM

## 2019-03-25 DIAGNOSIS — M25.511 RIGHT SHOULDER PAIN, UNSPECIFIED CHRONICITY: Primary | ICD-10-CM

## 2019-03-25 PROCEDURE — 97110 THERAPEUTIC EXERCISES: CPT

## 2019-03-25 PROCEDURE — 97140 MANUAL THERAPY 1/> REGIONS: CPT

## 2019-03-25 NOTE — PROGRESS NOTES
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    3  Shoulder dislocation, right, sequela S43 004S                   Subjective: Pt reports "My shoulder feels better than  it has felt in a long time"  Pain reported with isometric flexion  Objective: See treatment diary below    Precautions: HTN, HLD, anterior shoulder dislocation (19)    Daily Treatment Diary   Assessment 3/4 3/11 3/13 3/18 3/20 3/25       Eval/Reval MD BUTLER 72     64       POC Signed             HEP Issued  Yes     yes       Manuals 3/4 3/11 3/13 3/18 3/20 3/25       R shoulder PROM - caution with ER  MD [facility] MD [facility]       Exercise Diary  3/4 3/11 3/13 3/18 3/20 3/25       Table Slides: Flexion & Scaption 5"x10 ea 5"x15 ea 15x5" ea 5"x5 ea HEP -       Scap Retractions 5"x10 5"x10 20x5" HEP HEP -       Flexion Wall Slides  5"x10 10x5" 5"x10 5"x15  @ mirror 5"x20 @ mirror       Supine Wand Press Ups  2#  5"x10 2#  5"x10 2# 5"x10 2#  5"x15 2# 5"x20       Supine Wand Flexion  5"x10 5"x10 10"x10 5"x15 10"x10       S/L ER  5"x10 10x5" 5"x10 5"  2x10 1# 5"x20       Sub Max Iso: Flex, Abd, Add  5"x10 ea 5"x10 ea 5"x5 ea 5"x10 ea 5"x5 ea       Pulleys w/u   5' 10"x5' 10" hold x 5 min 10"x5'       AROM flexion and scaption- standing    10x ea to eye level 15 ea - shoulder level 20x ea       IR stretch strap    20"x5 hold -                                 Table ER stretch    10"x5 hold        Modalities  3/11 3/13 3/18 3/20 3/25       CP R shoulder  Seated  Post-Tx  10' deferred - - -                      HEP: Table slides, scap retractions, standing flexion and abduction, wall slides and wand shoulder flexion supine    Assessment: Tolerated treatment well   Patient demonstrated fatigue post treatment, exhibited good technique with therapeutic exercises and would benefit from continued PT to continue to improve his R shoulder pain and function with ADLs and increase strength  Noted decreased pain and increased mobility with passive external rotation  Issued and instructed in updated HEP  Plan: Continue per plan of care  Progress treatment as tolerated        Jose Reddy, PTA

## 2019-03-27 ENCOUNTER — EVALUATION (OUTPATIENT)
Dept: PHYSICAL THERAPY | Facility: CLINIC | Age: 79
End: 2019-03-27
Payer: MEDICARE

## 2019-03-27 DIAGNOSIS — M25.511 RIGHT SHOULDER PAIN, UNSPECIFIED CHRONICITY: Primary | ICD-10-CM

## 2019-03-27 DIAGNOSIS — M21.821 HILL SACHS DEFORMITY, RIGHT: ICD-10-CM

## 2019-03-27 DIAGNOSIS — S43.004S SHOULDER DISLOCATION, RIGHT, SEQUELA: ICD-10-CM

## 2019-03-27 PROCEDURE — 97140 MANUAL THERAPY 1/> REGIONS: CPT | Performed by: PHYSICAL THERAPIST

## 2019-03-27 PROCEDURE — 97110 THERAPEUTIC EXERCISES: CPT | Performed by: PHYSICAL THERAPIST

## 2019-03-27 PROCEDURE — 97164 PT RE-EVAL EST PLAN CARE: CPT | Performed by: PHYSICAL THERAPIST

## 2019-03-27 NOTE — PROGRESS NOTES
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    3  Shoulder dislocation, right, sequela [id number]                   Assessment  Assessment details: Pt is a [de-identified]year old right-handed male approximately 6 weeks s/p right anterior shoulder dislocation with hill sachs deformity who has been regularly attending PT for 7 sessions  Pt has made good progress with passive shoulder range of motion, little progress with his active range of motion and strength  Due to these deficits, pt is having difficulty with functional activities including reaching overhead, reaching behind his back and lying on his right side  Pt with positive clinical tests including ER lag sign, Supraspinatus Test, Drop Arm Test   Pt with signs and symptoms consistent with rotator cuff pathology and will benefit from further evaluation by orthopedic specialist  Pt is a good candidate for skilled PT intervention and will benefit from treatment in order to address his limitations and to restore maximal function  Impairments: abnormal coordination, abnormal or restricted ROM, activity intolerance, impaired physical strength and pain with function  Understanding of Dx/Px/POC: good   Prognosis: good    Goals  Short Term Goals: To be achieved by 4 weeks    1) Patient to be independent with basic HEP  MET  2) Decrease pain to 5/10 at worst  NOT MET  3) Increase ROM by 5 degrees or greater in all deficient planes  NOT MET   4) Increase strength by 1/2 MMT grade or greater in all deficient planes  NOT MET        Long Term Goals: To be achieved by discharge - NOT MET    1) FOTO equal to or greater than 74   2) Patient to be independent with comprehensive HEP    3) Decrease pain to 3/10 at worst  for improved quality of life  4) Increase strength to 5/5 MMT grade in all deficient planes to improve a/iadls  5) Increase ROM to within normal limits  Plan  Patient would benefit from: PT eval and skilled PT  Planned modality interventions: cryotherapy and thermotherapy: hydrocollator packs  Planned therapy interventions: IADL retraining, body mechanics training, flexibility, functional ROM exercises, home exercise program, neuromuscular re-education, manual therapy, postural training, strengthening, stretching, therapeutic activities, therapeutic exercise and joint mobilization  Frequency: 2x week  Duration in visits: 8  Duration in weeks: 4  Treatment plan discussed with: patient        Subjective Evaluation    History of Present Illness  Mechanism of injury: Pt is a [de-identified]year old right-handed male presenting to PT approximately 3 weeks s/p right anterior shoulder dislocation  Pt reports he walked out of his home and stepped onto his driveway and slipped on black ice, falling forward with his arm out in front  Pt reports he had immediate right shoulder pain, a neighbor witnessed the fall and called EMS  Pt was taken to ER where x-rays confirmed anterior shoulder dislocation  Pt was given medication and his shoulder was reduced, follow-up x-ray showed: Successful reduction of right shoulder dislocation, Hill-Sachs fracture of the right humeral head  Pt placed in sling for 2 weeks  Pt was evaluated by Dr Ivette Huff, and referred to OPPT  Pain Location: right shoulder, radiating down to elbow    Pain Type: pulling, aching    Pain Intensity:  Current: 0  Best: 0  Worst: 6      Pt reports increased pain and/or difficulty with: reaching overhead or behind his back, lifting, right side-lying; pt denies sleep disturbance secondary to shoulder pain       Pt reports decreased pain with: rest, Tylenol  ________________________________________________________  3/27/19 Re-Evaluation: Pt reports 75% improvement in pain and function since beginning PT  He reports continued difficulty and pain with reaching overhead, reaching behind his back and lying on right side  He complains of weakness with his shoulder  Pain Location: right shoulder, radiating down to elbow    Pain Type: pulling, aching    Pain Intensity:  Current: 0  Best: 0  Worst: 6    Pt reports increased pain and/or difficulty with: reaching overhead or behind his back, lifting, right side-lying; pt denies sleep disturbance secondary to shoulder pain  Pt reports decreased pain with: rest, Tylenol        Diagnostic Tests  X-ray: abnormal  Treatments  Previous treatment: immobilization  Patient Goals  Patient goals for therapy: decreased pain, increased motion, increased strength and independence with ADLs/IADLs          Objective     Postural Observations    Additional Postural Observation Details  Forward head, bilateral rounded shoulders, mildly increased thoracic kyphosis      Cervical/Thoracic Screen   Cervical range of motion within normal limits    Neurological Testing     Sensation     Shoulder   Left Shoulder   Intact: light touch    Right Shoulder   Intact: light touch    Active Range of Motion   Left Shoulder   Flexion: 155 degrees   Abduction: 145 degrees   External rotation BTH: T3   Internal rotation BTB: T12     Right Shoulder   Flexion: 130 degrees with pain  Abduction: 105 degrees with pain  External rotation BTH: C3 with pain  Internal rotation BTB: L2 with pain    Passive Range of Motion   Left Shoulder   Flexion: 165 degrees   Abduction: 165 degrees   External rotation 90°: 90 degrees   Internal rotation 90°: 25 degrees     Right Shoulder   Flexion: 145 degrees with pain  Abduction: 145 degrees with pain  External rotation 90°: 75 degrees with pain  Internal rotation 90°: 20 degrees with pain    Joint Play     Additional Joint Play Details  Not tested secondary to recent anterior dislocation    Strength/Myotome Testing     Left Shoulder     Planes of Motion   Flexion: 5   Abduction: 5   External rotation at 0°: 5   Internal rotation at 0°: 5     Isolated Muscles   Biceps: 5   Triceps: 5     Right Shoulder     Planes of Motion   Flexion: 3   Abduction: 3   External rotation at 0°: 2-   Internal rotation at 0°: 4     Isolated Muscles   Biceps: 4+   Triceps: 4     Tests     Right Shoulder   Positive drop arm, empty can and external rotation lag sign       Precautions: HTN, HLD, anterior shoulder dislocation (2/14/19)    Daily Treatment Diary   Assessment 3/4 3/11 3/13 3/18 3/20 3/25 3/27      Jose/Timothy BUTLER 70     57       POC Signed Yes            HEP Issued  Yes     yes       Manuals 3/4 3/11 3/13 3/18 3/20 3/25 3/27      R shoulder PROM - caution with ER  MD HY 31 Soha Borges MD 31 Soha Borges MD      Exercise Diary  3/4 3/11 3/13 3/18 3/20 3/25 3/27      Table Slides: Flexion & Scaption 5"x10 ea 5"x15 ea 15x5" ea 5"x5 ea HEP - -      Scap Retractions 5"x10 5"x10 20x5" HEP HEP - -      Flexion Wall Slides  5"x10 10x5" 5"x10 5"x15  @ mirror 5"x20 @ mirror 5"x20 @ mirror      Supine Wand Press Ups  2#  5"x10 2#  5"x10 2# 5"x10 2#  5"x15 2# 5"x20 2# 5"x20      Supine Wand Flexion  5"x10 5"x10 10"x10 5"x15 10"x10 10"x10      S/L ER  5"x10 10x5" 5"x10 5"  2x10 1# 5"x20 5"  2x10      Sub Max Iso: Flex, Abd, Add  5"x10 ea 5"x10 ea 5"x5 ea 5"x10 ea 5"x5 ea 5"x10 ea      Pulleys w/u   5' 10"x5' 10" hold x 5 min 10"x5' 10" x 5 min      AROM flexion and scaption- standing    10x ea to eye level 15 ea - shoulder level 20x ea 20 ea      IR stretch strap    20"x5 hold - -                                Table ER stretch    10"x5 hold  -      Modalities  3/11 3/13 3/18 3/20 3/25 3/27      CP R shoulder  Seated  Post-Tx  10' deferred - - - -

## 2019-03-29 ENCOUNTER — OFFICE VISIT (OUTPATIENT)
Dept: OBGYN CLINIC | Facility: MEDICAL CENTER | Age: 79
End: 2019-03-29
Payer: MEDICARE

## 2019-03-29 VITALS
DIASTOLIC BLOOD PRESSURE: 75 MMHG | BODY MASS INDEX: 31.89 KG/M2 | WEIGHT: 203.2 LBS | HEART RATE: 63 BPM | SYSTOLIC BLOOD PRESSURE: 113 MMHG | HEIGHT: 67 IN

## 2019-03-29 DIAGNOSIS — M25.511 RIGHT SHOULDER PAIN, UNSPECIFIED CHRONICITY: Primary | ICD-10-CM

## 2019-03-29 PROCEDURE — 99213 OFFICE O/P EST LOW 20 MIN: CPT | Performed by: ORTHOPAEDIC SURGERY

## 2019-03-29 NOTE — PROGRESS NOTES
Orthopaedic Surgery - Office Note  Shahid Kennedy (74 y o  male)   : 1940   MRN: [id number]  Encounter Date: 3/29/2019    Chief Complaint   Patient presents with    Right Shoulder - Follow-up       Assessment / Plan  Status post right shoulder dislocation on 2019  Right rotator cuff tear    · Continue physical therapy  New script was provided to include rotator cuff exercises  · Rotator cuff repair was discussed  Patient wishes to proceed with conservative management  · Consider cortisone injection or MRI  · No follow-ups on file  History of Present Illness  Shahid Kennedy is a [de-identified] y o  male who presents to the office status post right shoulder anterior dislocation on 2019  He is right hand dominant  He is experiencing lateral shoulder pain made worse with overhead activities  He continues to go to physical therapy  He does note that the weather may aggravate his symptoms  He reports improved pain, motion and strength since his initial injury  He is here to further discuss his possible left rotator cuff tear  Review of Systems  Pertinent items are noted in HPI  All other systems were reviewed and are negative  Physical Exam  /75   Pulse 63   Ht 5' 7" (1 702 m)   Wt 92 2 kg (203 lb 3 2 oz)   BMI 31 83 kg/m²   Cons: Appears well  No apparent distress  Psych: Alert  Oriented x3  Mood and affect normal   Eyes: PERRLA, EOMI  Resp: Normal effort  No audible wheezing or stridor  CV: Palpable pulse  No discernable arrhythmia  No LE edema  Lymph:  No palpable cervical, axillary, or inguinal lymphadenopathy  Skin: Warm  No palpable masses  No visible lesions  Neuro: Normal muscle tone  Normal and symmetric DTR's  Right Shoulder Exam  Alignment / Posture:  Normal shoulder posture  Inspection:  No swelling  Palpation:  No tenderness  ROM:  Shoulder  degrees active and 170 degrees passive  Shoulder ER 60 degrees  Shoulder IR T10    Strength:  Shoulder FE 4-/5  Shoulder ER 3+/5  Stability:  Not tested  Tests: (+) Hooper  (+) Neer  (-) Belly press  Neurovascular:  Sensation intact in Ax/R/M/U nerve distributions  Palpable radial pulse  Studies Reviewed  No studies to review    Procedures  No procedures today  Medical, Surgical, Family, and Social History  The patient's medical history, family history, and social history, were reviewed and updated as appropriate  Past Medical History:   Diagnosis Date    Anterior shoulder dislocation 02/14/2019    right shoulder    Hypercholesteremia     Hypertension     Myocardial infarction [facility]) 2010       History reviewed  No pertinent surgical history  History reviewed  No pertinent family history      Social History     Occupational History    Not on file   Tobacco Use    Smoking status: Former Smoker    Smokeless tobacco: Never Used   Substance and Sexual Activity    Alcohol use: Not on file    Drug use: Not on file    Sexual activity: Not on file       Allergies   Allergen Reactions    Alphagan [Brimonidine] Rash         Current Outpatient Medications:     aspirin (ECOTRIN LOW STRENGTH) 81 mg EC tablet, Take 81 mg by mouth daily, Disp: , Rfl:     atorvastatin (LIPITOR) 40 mg tablet, Take 40 mg by mouth daily, Disp: , Rfl:     latanoprost (XALATAN) 0 005 % ophthalmic solution, Administer 1 drop to both eyes daily at bedtime, Disp: , Rfl:     lisinopril (ZESTRIL) 5 mg tablet, Take 5 mg by mouth daily, Disp: , Rfl:     metoprolol succinate (TOPROL-XL) 25 mg 24 hr tablet, Take 25 mg by mouth daily, Disp: , Rfl:     timolol (BETIMOL) 0 25 % ophthalmic solution, Administer 1 drop to both eyes daily, Disp: , Rfl:     acetaminophen (TYLENOL) 325 mg tablet, Take 2 tablets (650 mg total) by mouth every 6 (six) hours as needed for mild pain or moderate pain (Patient not taking: Reported on 3/29/2019), Disp: 30 tablet, Rfl: 0    Cholecalciferol 1000 units TBDP, Take 1,000 Units by mouth daily, Disp: , Rfl:       Latricia Shaw    [place]    I,:    am acting as a scribe while in the presence of the attending physician :        I,:    personally performed the services described in this documentation    as scribed in my presence :

## 2019-04-01 ENCOUNTER — OFFICE VISIT (OUTPATIENT)
Dept: PHYSICAL THERAPY | Facility: CLINIC | Age: 79
End: 2019-04-01
Payer: MEDICARE

## 2019-04-01 DIAGNOSIS — M21.821 HILL SACHS DEFORMITY, RIGHT: ICD-10-CM

## 2019-04-01 DIAGNOSIS — S43.004S SHOULDER DISLOCATION, RIGHT, SEQUELA: ICD-10-CM

## 2019-04-01 DIAGNOSIS — M25.511 RIGHT SHOULDER PAIN, UNSPECIFIED CHRONICITY: Primary | ICD-10-CM

## 2019-04-01 PROCEDURE — 97110 THERAPEUTIC EXERCISES: CPT

## 2019-04-01 PROCEDURE — 97140 MANUAL THERAPY 1/> REGIONS: CPT

## 2019-04-03 ENCOUNTER — OFFICE VISIT (OUTPATIENT)
Dept: PHYSICAL THERAPY | Facility: CLINIC | Age: 79
End: 2019-04-03
Payer: MEDICARE

## 2019-04-03 DIAGNOSIS — M21.821 HILL SACHS DEFORMITY, RIGHT: ICD-10-CM

## 2019-04-03 DIAGNOSIS — S43.004S SHOULDER DISLOCATION, RIGHT, SEQUELA: ICD-10-CM

## 2019-04-03 DIAGNOSIS — M25.511 RIGHT SHOULDER PAIN, UNSPECIFIED CHRONICITY: Primary | ICD-10-CM

## 2019-04-03 PROCEDURE — 97140 MANUAL THERAPY 1/> REGIONS: CPT

## 2019-04-03 PROCEDURE — 97110 THERAPEUTIC EXERCISES: CPT

## 2019-04-08 ENCOUNTER — OFFICE VISIT (OUTPATIENT)
Dept: PHYSICAL THERAPY | Facility: CLINIC | Age: 79
End: 2019-04-08
Payer: MEDICARE

## 2019-04-08 DIAGNOSIS — S43.004S SHOULDER DISLOCATION, RIGHT, SEQUELA: ICD-10-CM

## 2019-04-08 DIAGNOSIS — M25.511 RIGHT SHOULDER PAIN, UNSPECIFIED CHRONICITY: Primary | ICD-10-CM

## 2019-04-08 DIAGNOSIS — M21.821 HILL SACHS DEFORMITY, RIGHT: ICD-10-CM

## 2019-04-08 PROCEDURE — 97110 THERAPEUTIC EXERCISES: CPT

## 2019-04-10 ENCOUNTER — APPOINTMENT (OUTPATIENT)
Dept: PHYSICAL THERAPY | Facility: CLINIC | Age: 79
End: 2019-04-10
Payer: MEDICARE

## 2019-04-15 ENCOUNTER — TRANSCRIBE ORDERS (OUTPATIENT)
Dept: PHYSICAL THERAPY | Facility: CLINIC | Age: 79
End: 2019-04-15

## 2019-04-15 ENCOUNTER — EVALUATION (OUTPATIENT)
Dept: PHYSICAL THERAPY | Facility: CLINIC | Age: 79
End: 2019-04-15
Payer: MEDICARE

## 2019-04-15 DIAGNOSIS — M51.36 DEGENERATIVE DISC DISEASE, LUMBAR: ICD-10-CM

## 2019-04-15 DIAGNOSIS — M54.50 ACUTE BILATERAL LOW BACK PAIN WITHOUT SCIATICA: Primary | ICD-10-CM

## 2019-04-15 DIAGNOSIS — M54.5 ACUTE LOW BACK PAIN, UNSPECIFIED BACK PAIN LATERALITY, WITH SCIATICA PRESENCE UNSPECIFIED: Primary | ICD-10-CM

## 2019-04-15 PROCEDURE — G8978 MOBILITY CURRENT STATUS: HCPCS | Performed by: PHYSICAL THERAPIST

## 2019-04-15 PROCEDURE — 97162 PT EVAL MOD COMPLEX 30 MIN: CPT | Performed by: PHYSICAL THERAPIST

## 2019-04-15 PROCEDURE — G8979 MOBILITY GOAL STATUS: HCPCS | Performed by: PHYSICAL THERAPIST

## 2019-04-19 ENCOUNTER — APPOINTMENT (OUTPATIENT)
Dept: PHYSICAL THERAPY | Facility: CLINIC | Age: 79
End: 2019-04-19
Payer: MEDICARE

## 2019-04-22 ENCOUNTER — APPOINTMENT (OUTPATIENT)
Dept: PHYSICAL THERAPY | Facility: CLINIC | Age: 79
End: 2019-04-22
Payer: MEDICARE

## 2019-04-25 ENCOUNTER — OFFICE VISIT (OUTPATIENT)
Dept: PHYSICAL THERAPY | Facility: CLINIC | Age: 79
End: 2019-04-25
Payer: MEDICARE

## 2019-04-25 DIAGNOSIS — M54.5 ACUTE LOW BACK PAIN, UNSPECIFIED BACK PAIN LATERALITY, WITH SCIATICA PRESENCE UNSPECIFIED: Primary | ICD-10-CM

## 2019-04-25 DIAGNOSIS — M51.36 DEGENERATIVE DISC DISEASE, LUMBAR: ICD-10-CM

## 2019-04-25 PROCEDURE — 97110 THERAPEUTIC EXERCISES: CPT

## 2019-04-25 PROCEDURE — 97112 NEUROMUSCULAR REEDUCATION: CPT

## 2019-04-29 ENCOUNTER — APPOINTMENT (OUTPATIENT)
Dept: PHYSICAL THERAPY | Facility: CLINIC | Age: 79
End: 2019-04-29
Payer: MEDICARE